# Patient Record
Sex: MALE | Race: WHITE | Employment: OTHER | URBAN - METROPOLITAN AREA
[De-identification: names, ages, dates, MRNs, and addresses within clinical notes are randomized per-mention and may not be internally consistent; named-entity substitution may affect disease eponyms.]

---

## 2020-07-09 ENCOUNTER — HOSPITAL ENCOUNTER (EMERGENCY)
Age: 55
Discharge: HOME | End: 2020-07-09
Attending: EMERGENCY MEDICINE
Payer: COMMERCIAL

## 2020-07-09 ENCOUNTER — APPOINTMENT (OUTPATIENT)
Dept: GENERAL RADIOLOGY | Age: 55
End: 2020-07-09
Payer: COMMERCIAL

## 2020-07-09 VITALS
WEIGHT: 297 LBS | BODY MASS INDEX: 39.36 KG/M2 | SYSTOLIC BLOOD PRESSURE: 111 MMHG | DIASTOLIC BLOOD PRESSURE: 66 MMHG | TEMPERATURE: 98.6 F | HEART RATE: 92 BPM | RESPIRATION RATE: 22 BRPM | HEIGHT: 73 IN | OXYGEN SATURATION: 96 %

## 2020-07-09 LAB
ANION GAP SERPL CALCULATED.3IONS-SCNC: 12 MMOL/L (ref 3–16)
BASOPHILS ABSOLUTE: 0.1 K/UL (ref 0–0.2)
BASOPHILS RELATIVE PERCENT: 0.8 %
BUN BLDV-MCNC: 22 MG/DL (ref 7–20)
CALCIUM SERPL-MCNC: 9.6 MG/DL (ref 8.3–10.6)
CHLORIDE BLD-SCNC: 99 MMOL/L (ref 99–110)
CO2: 27 MMOL/L (ref 21–32)
CREAT SERPL-MCNC: 0.9 MG/DL (ref 0.9–1.3)
EOSINOPHILS ABSOLUTE: 0.2 K/UL (ref 0–0.6)
EOSINOPHILS RELATIVE PERCENT: 1.6 %
GFR AFRICAN AMERICAN: >60
GFR NON-AFRICAN AMERICAN: >60
GLUCOSE BLD-MCNC: 151 MG/DL (ref 70–99)
HCT VFR BLD CALC: 39.7 % (ref 40.5–52.5)
HEMOGLOBIN: 13.8 G/DL (ref 13.5–17.5)
INR BLD: 0.99 (ref 0.86–1.14)
LYMPHOCYTES ABSOLUTE: 2.2 K/UL (ref 1–5.1)
LYMPHOCYTES RELATIVE PERCENT: 22.6 %
MCH RBC QN AUTO: 33.6 PG (ref 26–34)
MCHC RBC AUTO-ENTMCNC: 34.7 G/DL (ref 31–36)
MCV RBC AUTO: 96.8 FL (ref 80–100)
MONOCYTES ABSOLUTE: 0.9 K/UL (ref 0–1.3)
MONOCYTES RELATIVE PERCENT: 9.3 %
NEUTROPHILS ABSOLUTE: 6.4 K/UL (ref 1.7–7.7)
NEUTROPHILS RELATIVE PERCENT: 65.7 %
PDW BLD-RTO: 13.3 % (ref 12.4–15.4)
PLATELET # BLD: 177 K/UL (ref 135–450)
PMV BLD AUTO: 10 FL (ref 5–10.5)
POTASSIUM SERPL-SCNC: 3.7 MMOL/L (ref 3.5–5.1)
PROTHROMBIN TIME: 11.5 SEC (ref 10–13.2)
RBC # BLD: 4.1 M/UL (ref 4.2–5.9)
SODIUM BLD-SCNC: 138 MMOL/L (ref 136–145)
TROPONIN: <0.01 NG/ML
WBC # BLD: 9.8 K/UL (ref 4–11)

## 2020-07-09 PROCEDURE — 71046 X-RAY EXAM CHEST 2 VIEWS: CPT

## 2020-07-09 PROCEDURE — 36415 COLL VENOUS BLD VENIPUNCTURE: CPT

## 2020-07-09 PROCEDURE — 80048 BASIC METABOLIC PNL TOTAL CA: CPT

## 2020-07-09 PROCEDURE — 84484 ASSAY OF TROPONIN QUANT: CPT

## 2020-07-09 PROCEDURE — 85610 PROTHROMBIN TIME: CPT

## 2020-07-09 PROCEDURE — 85025 COMPLETE CBC W/AUTO DIFF WBC: CPT

## 2020-07-09 PROCEDURE — 93005 ELECTROCARDIOGRAM TRACING: CPT | Performed by: EMERGENCY MEDICINE

## 2020-07-09 PROCEDURE — 99285 EMERGENCY DEPT VISIT HI MDM: CPT

## 2020-07-09 ASSESSMENT — PAIN DESCRIPTION - PAIN TYPE: TYPE: ACUTE PAIN

## 2020-07-09 ASSESSMENT — PAIN DESCRIPTION - LOCATION: LOCATION: CHEST

## 2020-07-09 ASSESSMENT — PAIN SCALES - GENERAL: PAINLEVEL_OUTOF10: 8

## 2020-07-09 NOTE — ED PROVIDER NOTES
I independently performed a history and physical on Keith Woods. All diagnostic, treatment, and disposition decisions were made by myself in conjunction with the advanced practice provider. Briefly, this is a 47 y.o. male here for chest pain constant for past 3 weeks, right sided, mid-sternal, without dyspnea, worse with ROM, worse with swinging a golf club  Mentioned pain to his friends while trying 18 holes today and they drug him in. Presently asymptomatic  He notes sx when swinging a club, but not during any other exertion. On exam, WDWN M, NAD, Heart RRR, Lungs cTAB, no r/r/w. Abdomen soft, NT, ND.    EKG  EKG reviewed myself. Dated today at [employer]. Rate 92. Normal sinus rhythm. Slight Q wave in aVL. No prior          Screenings                   MDM  Chest Pain; HEART score 3. F/u with Cards. Patient Referrals:  [place]  [address] [address] [id number] [phone]    Go in 2 days  As needed, If symptoms worsen    [facility] Emergency Department  [address] [facility]  [phone]  Go to   As needed    Chandan Roach MD  [address]  [phone]            Discharge Medications:  New Prescriptions    No medications on file       FINAL IMPRESSION  1. Chest pain, unspecified type        Blood pressure 124/88, pulse 90, temperature 98.6 °F (37 °C), temperature source Oral, resp. rate 18, height 6' 1\" (1.854 m), weight 297 lb (134.7 kg), SpO2 96 %. For further details of [facility] emergency department encounter, please see documentation by advanced practice provider, Juanito Robbins.        Jim Mcclendon MD  07/09/20 2020

## 2020-07-10 LAB
EKG ATRIAL RATE: 92 BPM
EKG DIAGNOSIS: NORMAL
EKG P AXIS: 55 DEGREES
EKG P-R INTERVAL: 198 MS
EKG Q-T INTERVAL: 366 MS
EKG QRS DURATION: 94 MS
EKG QTC CALCULATION (BAZETT): 452 MS
EKG R AXIS: -22 DEGREES
EKG T AXIS: 39 DEGREES
EKG VENTRICULAR RATE: 92 BPM

## 2020-07-10 PROCEDURE — 93010 ELECTROCARDIOGRAM REPORT: CPT | Performed by: INTERNAL MEDICINE

## 2020-07-10 NOTE — ED NOTES
Discharge instructions given, patient acknowledged understanding, patient ambulated out of ed upon discharge with no wants or needs      Joi Addison RN  07/09/20 2029

## 2020-07-13 ENCOUNTER — OFFICE VISIT (OUTPATIENT)
Dept: CARDIOLOGY CLINIC | Age: 55
End: 2020-07-13
Payer: COMMERCIAL

## 2020-07-13 VITALS
SYSTOLIC BLOOD PRESSURE: 115 MMHG | RESPIRATION RATE: 18 BRPM | HEART RATE: 82 BPM | TEMPERATURE: 96.8 F | WEIGHT: 299.2 LBS | BODY MASS INDEX: 39.65 KG/M2 | DIASTOLIC BLOOD PRESSURE: 90 MMHG | HEIGHT: 73 IN | OXYGEN SATURATION: 95 %

## 2020-07-13 PROCEDURE — 99244 OFF/OP CNSLTJ NEW/EST MOD 40: CPT | Performed by: INTERNAL MEDICINE

## 2020-07-13 PROCEDURE — 93000 ELECTROCARDIOGRAM COMPLETE: CPT | Performed by: INTERNAL MEDICINE

## 2020-07-13 RX ORDER — LANSOPRAZOLE 15 MG/1
15 CAPSULE, DELAYED RELEASE ORAL DAILY
COMMUNITY

## 2020-07-13 NOTE — PATIENT INSTRUCTIONS
Modify cardiac risk factors with diet, exercise (recommend 150 min a week), keeping BP and diabetes under control, and quitting smoking  No medication changes    Referral to   69 Abdias Hui MD  [address] [place] [address] [address] [place] [address], [address] [place] [address]  Phone: [phone]  Fax: [phone]    Stress echo with appt the same day- Della Robb will have to call you with day and time
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
## 2020-08-01 NOTE — ED PROVIDER NOTES
[address] [facility]        Pt Name: David Marques  MRN: [id number]  [place] 1965  Date of evaluation: 7/9/2020  Provider: FAUSTO Mccarty CNP  PCP: Star Banda, DO     I have seen and evaluated this patient with my supervising physician Dr. Vinicio Peoples   Patient presents with    Chest Pain     pt with chest pain for 3 weeks, pain in upper arm and shoulder on R, denies SOB       HISTORY OF PRESENT ILLNESS   (Location, Timing/Onset, Context/Setting, Quality, Duration, Modifying Factors, Severity, Associated Signs and Symptoms)  Note limiting factors. David Marques is a 54 y.o. male with medical history of back pain, hypertension, LAP-BAND who presents the ED with complaints of midsternal chest pain that is been intermittent for the past 3 weeks. Patient said he was out golfing with his friends today and had mentioned it to them. They were concerned as they said it possibly could be his heart and prompted him to get checked out. They instructed him to go straight to the ED for a cardiac evaluation and he now presents to the ER with this complaint. He said the pain does seem worse with chest movement, as in swinging a golf club. He denies any current pain, 0/10. He denies any associated symptoms to include cough, short of air, nausea or vomiting, diarrhea, diaphoresis, leg swelling, numbness, tingling, weakness, lighthead, dizzy, or syncope. Nursing Notes were all reviewed and agreed with or any disagreements were addressed in the HPI. REVIEW OF SYSTEMS    (2-9 systems for level 4, 10 or more for level 5)     Review of Systems    Positives and Pertinent negatives as per HPI. Except as noted above in the ROS, all other systems were reviewed and negative.        PAST MEDICAL HISTORY     Past Medical History:   Diagnosis Date    Back pain     Hypertension          SURGICAL HISTORY     Past Surgical History:   Procedure Laterality Date    LAP BAND      OTHER SURGICAL HISTORY  2010    mass removed from right forearm         CURRENTMEDICATIONS       Discharge Medication List as of 7/9/2020  8:22 PM      CONTINUE these medications which have NOT CHANGED    Details   atorvastatin (LIPITOR) 20 MG tablet TK 1 T PO D, R-1Historical Med      glyBURIDE (DIABETA) 5 MG tablet TK 1/2 T PO BID FOR 1 WEEK THEN INCREASE TO 1 BID, R-5Historical Med      lisinopril-hydrochlorothiazide (PRINZIDE;ZESTORETIC) 20-12.5 MG per tablet TK 2 TS PO D, R-3Historical Med      metoprolol succinate (TOPROL XL) 50 MG extended release tablet TK 1 T PO D, R-1Historical Med      cyclobenzaprine (FLEXERIL) 10 MG tablet R-0Historical Med      lisinopril (PRINIVIL;ZESTRIL) 2.5 MG tablet Take 2.5 mg by mouth daily               ALLERGIES     Patient has no known allergies. FAMILYHISTORY       Family History   Problem Relation Age of Onset    Cancer Father     Heart Disease Paternal Grandfather           SOCIAL HISTORY       Social History     Tobacco Use    Smoking status: Never Smoker    Smokeless tobacco: Never Used   Substance Use Topics    Alcohol use: Yes     Alcohol/week: 7.0 standard drinks     Types: 7 Cans of beer per week     Comment: daily    Drug use: Not on file       SCREENINGS             PHYSICAL EXAM    (up to 7 for level 4, 8 or more for level 5)     ED Triage Vitals [07/09/20 1801]   BP Temp Temp Source Pulse Resp SpO2 Height Weight   124/88 98.6 °F (37 °C) Oral 90 18 96 % 6' 1\" (1.854 m) 297 lb (134.7 kg)       Physical Exam  Vitals signs and nursing note reviewed. Constitutional:       General: He is awake. Appearance: Normal appearance. He is well-developed. He is obese. HENT:      Head: Normocephalic and atraumatic. Nose: Nose normal.   Eyes:      General:         Right eye: No discharge. Left eye: No discharge. Neck:      Musculoskeletal: Normal range of motion. Cardiovascular:      Rate and Rhythm: Normal rate and regular rhythm. Heart sounds: Normal heart sounds. Pulmonary:      Effort: Pulmonary effort is normal. No respiratory distress. Breath sounds: Normal breath sounds. Chest:      Chest wall: No tenderness. Abdominal:      General: Bowel sounds are normal.      Palpations: Abdomen is soft. Tenderness: There is no abdominal tenderness. Musculoskeletal: Normal range of motion. Right lower leg: No edema. Left lower leg: No edema. Skin:     General: Skin is warm and dry. Coloration: Skin is not pale. Neurological:      Mental Status: He is alert and oriented to person, place, and time. Psychiatric:         Behavior: Behavior normal. Behavior is cooperative. DIAGNOSTIC RESULTS   LABS:    Labs Reviewed   BASIC METABOLIC PANEL - Abnormal; Notable for the following components:       Result Value    Glucose 151 (*)     BUN 22 (*)     All other components within normal limits    Narrative:     Performed at:  [facility] [address], [place], [place]   Phone [phone]   CBC WITH AUTO DIFFERENTIAL - Abnormal; Notable for the following components:    RBC 4.10 (*)     Hematocrit 39.7 (*)     All other components within normal limits    Narrative:     Performed at:  [facility] [address], [place], [address] [employer]   Phone [phone]   TROPONIN    Narrative:     Performed at:  [facility] [address],  [place], [address] [employer]   Phone [phone]   PROTIME-INR    Narrative:     Performed at:  [facility] [address], [place], [place]   Phone [phone]       All other labs were within normal range or not returned as of this dictation. EKG:  All EKG's are interpreted by the Emergency Department Physician in the absence of a cardiologist.  Please see their note for interpretation of EKG. RADIOLOGY:   Non-plain film images such as CT, Ultrasound and MRI are read by the radiologist. Plain radiographic images are visualized and preliminarily interpreted by the ED Provider with the below findings:        Interpretation per the Radiologist below, if available at the time of this note:    XR CHEST STANDARD (2 VW)   Final Result   No acute findings in the chest.      Laparoscopically placed gastric band is not visualized on this study. No results found. PROCEDURES   Unless otherwise noted below, none     Procedures    CRITICAL CARE TIME   N/A    CONSULTS:  None      EMERGENCY DEPARTMENT COURSE and DIFFERENTIAL DIAGNOSIS/MDM:   Vitals:    Vitals:    07/09/20 1801 07/09/20 1900 07/09/20 1930 07/09/20 2000   BP: 124/88 103/65 103/67 111/66   Pulse: 90 85 85 92   Resp: 18 26 25 22   Temp: 98.6 °F (37 °C)      TempSrc: Oral      SpO2: 96%      Weight: 297 lb (134.7 kg)      Height: 6' 1\" (1.854 m)          Patient was given the following medications:  Medications - No data to display        Pertinent Labs & Imaging studies reviewed. (See chart for details)   -  Patient seen and evaluated in the emergency department. -  Triage and nursing notes reviewed and incorporated. -  Old chart records reviewed and incorporated. -  Patient case discussed with attending physician, Dr. Eli Rapp. They saw and examined patient. -  Differential diagnosis includes:  Dissection, MI, pneumonia, PE, CAD, angina, aneurysm, ACS, vs COVID19.  -  Work-up included:  See above CXR, EKG, CBC, troponin, BNP, BMP  -  ED treatment included:  none  - Consults: none   -  Results discussed with patient. Labs show troponin negative. BMP glucose 151, BUN 22. INR 0.99. CBC RBC 4.10, HCT 39.7. CXR with no acute findings in the chest. laparoscopically placed gastric band is not visualized on this study. Discussed results with the patient and offered admission.   Patient said he is feeling stable and would rather go home as he is not from this area. Pt was given strict return precautions. The patient is agreeable with plan of care and disposition.  -  Disposition:   home        FINAL IMPRESSION      1.  Chest pain, unspecified type          DISPOSITION/PLAN   DISPOSITION Decision To Discharge 07/09/2020 08:03:37 PM      PATIENT REFERREDTO:  Rell Engle, [address] [place] [address] [id number] [phone]    Go in 2 days  As needed, If symptoms worsen    [facility] Emergency Department  [address] [facility]  [phone]  Go to   As needed    Dasia Stewart MD  [address] [place] [address] Drive  [phone]            DISCHARGE MEDICATIONS:  Discharge Medication List as of 7/9/2020  8:22 PM          DISCONTINUED MEDICATIONS:  Discharge Medication List as of 7/9/2020  8:22 PM                 (Please note that portions of this note were completed with a voice recognition program.  Efforts were made to edit the dictations but occasionally words are mis-transcribed.)    FAUSTO Johnson CNP (electronically signed)           FAUSTO Johnson CNP  08/01/20 3158